# Patient Record
Sex: FEMALE | Race: BLACK OR AFRICAN AMERICAN | NOT HISPANIC OR LATINO | URBAN - METROPOLITAN AREA
[De-identification: names, ages, dates, MRNs, and addresses within clinical notes are randomized per-mention and may not be internally consistent; named-entity substitution may affect disease eponyms.]

---

## 2022-12-27 ENCOUNTER — EMERGENCY (EMERGENCY)
Facility: HOSPITAL | Age: 47
LOS: 1 days | Discharge: HOME | End: 2022-12-27
Attending: EMERGENCY MEDICINE | Admitting: EMERGENCY MEDICINE
Payer: COMMERCIAL

## 2022-12-27 VITALS
RESPIRATION RATE: 15 BRPM | OXYGEN SATURATION: 100 % | TEMPERATURE: 99 F | HEART RATE: 81 BPM | SYSTOLIC BLOOD PRESSURE: 144 MMHG | DIASTOLIC BLOOD PRESSURE: 59 MMHG

## 2022-12-27 LAB
ALBUMIN SERPL ELPH-MCNC: 4.3 G/DL — SIGNIFICANT CHANGE UP (ref 3.3–5)
ALP SERPL-CCNC: 64 U/L — SIGNIFICANT CHANGE UP (ref 40–120)
ALT FLD-CCNC: 11 U/L — SIGNIFICANT CHANGE UP (ref 4–33)
ANION GAP SERPL CALC-SCNC: 13 MMOL/L — SIGNIFICANT CHANGE UP (ref 7–14)
AST SERPL-CCNC: 14 U/L — SIGNIFICANT CHANGE UP (ref 4–32)
BASOPHILS # BLD AUTO: 0.02 K/UL — SIGNIFICANT CHANGE UP (ref 0–0.2)
BASOPHILS NFR BLD AUTO: 0.2 % — SIGNIFICANT CHANGE UP (ref 0–2)
BILIRUB SERPL-MCNC: 0.3 MG/DL — SIGNIFICANT CHANGE UP (ref 0.2–1.2)
BUN SERPL-MCNC: 10 MG/DL — SIGNIFICANT CHANGE UP (ref 7–23)
CALCIUM SERPL-MCNC: 10.3 MG/DL — SIGNIFICANT CHANGE UP (ref 8.4–10.5)
CHLORIDE SERPL-SCNC: 97 MMOL/L — LOW (ref 98–107)
CO2 SERPL-SCNC: 24 MMOL/L — SIGNIFICANT CHANGE UP (ref 22–31)
CREAT SERPL-MCNC: 0.82 MG/DL — SIGNIFICANT CHANGE UP (ref 0.5–1.3)
EGFR: 88 ML/MIN/1.73M2 — SIGNIFICANT CHANGE UP
EOSINOPHIL # BLD AUTO: 0.03 K/UL — SIGNIFICANT CHANGE UP (ref 0–0.5)
EOSINOPHIL NFR BLD AUTO: 0.3 % — SIGNIFICANT CHANGE UP (ref 0–6)
GLUCOSE SERPL-MCNC: 120 MG/DL — HIGH (ref 70–99)
HCT VFR BLD CALC: 39.9 % — SIGNIFICANT CHANGE UP (ref 34.5–45)
HGB BLD-MCNC: 13.4 G/DL — SIGNIFICANT CHANGE UP (ref 11.5–15.5)
IANC: 9.01 K/UL — HIGH (ref 1.8–7.4)
IMM GRANULOCYTES NFR BLD AUTO: 0.4 % — SIGNIFICANT CHANGE UP (ref 0–0.9)
LIDOCAIN IGE QN: 192 U/L — HIGH (ref 7–60)
LYMPHOCYTES # BLD AUTO: 1.22 K/UL — SIGNIFICANT CHANGE UP (ref 1–3.3)
LYMPHOCYTES # BLD AUTO: 11.3 % — LOW (ref 13–44)
MCHC RBC-ENTMCNC: 30 PG — SIGNIFICANT CHANGE UP (ref 27–34)
MCHC RBC-ENTMCNC: 33.6 GM/DL — SIGNIFICANT CHANGE UP (ref 32–36)
MCV RBC AUTO: 89.3 FL — SIGNIFICANT CHANGE UP (ref 80–100)
MONOCYTES # BLD AUTO: 0.47 K/UL — SIGNIFICANT CHANGE UP (ref 0–0.9)
MONOCYTES NFR BLD AUTO: 4.4 % — SIGNIFICANT CHANGE UP (ref 2–14)
NEUTROPHILS # BLD AUTO: 9.01 K/UL — HIGH (ref 1.8–7.4)
NEUTROPHILS NFR BLD AUTO: 83.4 % — HIGH (ref 43–77)
NRBC # BLD: 0 /100 WBCS — SIGNIFICANT CHANGE UP (ref 0–0)
NRBC # FLD: 0 K/UL — SIGNIFICANT CHANGE UP (ref 0–0)
PLATELET # BLD AUTO: 373 K/UL — SIGNIFICANT CHANGE UP (ref 150–400)
POTASSIUM SERPL-MCNC: 3.9 MMOL/L — SIGNIFICANT CHANGE UP (ref 3.5–5.3)
POTASSIUM SERPL-SCNC: 3.9 MMOL/L — SIGNIFICANT CHANGE UP (ref 3.5–5.3)
PROT SERPL-MCNC: 7.7 G/DL — SIGNIFICANT CHANGE UP (ref 6–8.3)
RBC # BLD: 4.47 M/UL — SIGNIFICANT CHANGE UP (ref 3.8–5.2)
RBC # FLD: 12.6 % — SIGNIFICANT CHANGE UP (ref 10.3–14.5)
SODIUM SERPL-SCNC: 134 MMOL/L — LOW (ref 135–145)
TROPONIN T, HIGH SENSITIVITY RESULT: 6 NG/L — SIGNIFICANT CHANGE UP
WBC # BLD: 10.79 K/UL — HIGH (ref 3.8–10.5)
WBC # FLD AUTO: 10.79 K/UL — HIGH (ref 3.8–10.5)

## 2022-12-27 PROCEDURE — 76705 ECHO EXAM OF ABDOMEN: CPT | Mod: 26

## 2022-12-27 PROCEDURE — 99285 EMERGENCY DEPT VISIT HI MDM: CPT

## 2022-12-27 RX ORDER — SODIUM CHLORIDE 9 MG/ML
1000 INJECTION INTRAMUSCULAR; INTRAVENOUS; SUBCUTANEOUS ONCE
Refills: 0 | Status: COMPLETED | OUTPATIENT
Start: 2022-12-27 | End: 2022-12-27

## 2022-12-27 RX ORDER — FAMOTIDINE 10 MG/ML
20 INJECTION INTRAVENOUS ONCE
Refills: 0 | Status: COMPLETED | OUTPATIENT
Start: 2022-12-27 | End: 2022-12-27

## 2022-12-27 RX ORDER — KETOROLAC TROMETHAMINE 30 MG/ML
15 SYRINGE (ML) INJECTION ONCE
Refills: 0 | Status: DISCONTINUED | OUTPATIENT
Start: 2022-12-27 | End: 2022-12-27

## 2022-12-27 RX ADMIN — FAMOTIDINE 20 MILLIGRAM(S): 10 INJECTION INTRAVENOUS at 23:59

## 2022-12-27 RX ADMIN — SODIUM CHLORIDE 1000 MILLILITER(S): 9 INJECTION INTRAMUSCULAR; INTRAVENOUS; SUBCUTANEOUS at 23:47

## 2022-12-27 RX ADMIN — Medication 15 MILLIGRAM(S): at 23:59

## 2022-12-27 NOTE — ED PROVIDER NOTE - NSFOLLOWUPINSTRUCTIONS_ED_ALL_ED_FT
Chronic Pancreatitis    Body outline showing digestive organs, with a close-up of a normal pancreas and an inflamed pancreas.   Chronic pancreatitis is permanent inflammation and scarring of the pancreas that leads to pancreatic dysfunction. The pancreas is a gland that is found behind the stomach. The pancreas makes proteins (enzymes) that help to digest food. It also releases hormones called glucagon and insulin. These help regulate blood sugar (glucose). Damage to the pancreas may affect digestion, may cause pain in the upper abdomen and back, and may cause diabetes. Inflammation can also irritate other organs in the abdomen near the pancreas.    At first, pancreatitis may be sudden (acute). When you have repeated or long-lasting episodes of acute pancreatitis, damage to the pancreas can be permanent and lead to chronic pancreatitis. Sometimes, though, there is no history of acute pancreatitis.      What are the causes?    The most common cause of this condition is heavy alcohol use. Other causes include:  •Hypertriglyceridemia. This is increased, or elevated, levels of triglycerides in the blood.      •Gallstones or other conditions that block the tube that drains the pancreas (pancreatic duct).      •Health conditions such as pancreatic cancer or a problem where the body's defense system (immune system) attacks the pancreas (autoimmune pancreatitis).      •Hypercalcemia. This is elevated calcium levels in the blood. This condition may be caused by the parathyroid gland being too active (hyperparathyroidism).      •Having an injured or infected pancreas.      •Being exposed to certain medicines or certain chemicals.      In children, chronic pancreatitis is most often caused by inherited conditions. These come from genes that are passed from parent to child. The most common of these conditions is cystic fibrosis.    In some cases, the cause of chronic pancreatitis may not be known.      What increases the risk?    This condition is more likely to develop in people who:  •Are male.      •Are 35–55 years old.      •Have a family history of pancreatitis.      •Smoke tobacco.      •Drink a lot of alcohol over a long period of time.        What are the signs or symptoms?    Symptoms of this condition may include:  •Pain in the abdomen or upper back. Pain may be severe and often gets worse after you eat.      •Nausea and vomiting.      •Fever.      •Weight loss.      •A change in the color and firmness (consistency) of stool (feces), such as stools that are oily, fatty, or severiano-colored.        How is this diagnosed?    This condition is diagnosed based on your symptoms, your medical history, and a physical exam. You may have tests, such as:  •Blood tests.      •Stool samples.      •Biopsy of the pancreas. This is the removal of a sample of pancreas tissue to be tested in a lab.      •Imaging tests, such as CT scans, MRIs, or an ultrasound of the abdomen.        How is this treated?    Chronic pancreatitis results in permanent damage that leads to pancreatic dysfunction and long-term (chronic) pain. To manage chronic pancreatitis, you will need to:  •Stop using alcohol or tobacco.    •Manage pain. Methods of pain control may include:  •Medicines such as those used for pain or depression (antidepressants).      •Surgery to remove a blockage or buildup of fluid causing pain.      •A procedure to block the nerves that sense pain in the pancreas (celiac plexus nerve block).      •Improve digestion. You may be given:  •Medicines to replace your pancreatic enzymes.      •Vitamin supplements.      •A specific diet to follow. You may work with a dietitian to make an eating plan.      •Monitor for the development of diabetes. You may need to:  •Get screened for diabetes regularly.      •Check your blood glucose levels at home at regular times.        Sometimes, acute flares of pain may require hospital treatment.      Follow these instructions at home:      Eating and drinking       A sign showing that a person should not drink alcohol.       Three cups showing dark yellow, yellow, and pale yellow urine.     • Do not drink alcohol. If you need help quitting, ask your health care provider.    •Follow a diet as told by your health care provider or dietitian, if this applies. This may include:  •Limiting how much fat you eat.      •Eating smaller meals more often.      •Avoiding caffeine.        •Drink enough fluid to keep your urine pale yellow.      General instructions     •Take over-the-counter and prescription medicines only as told by your health care provider. These include vitamin supplements.    •Ask your health care provider if the medicine prescribed to you:   •Requires you to avoid driving or using machinery.     •Can cause constipation. You may need to take these actions to prevent or treat constipation:   •Take over-the-counter or prescription medicines.       •Eat foods that are high in fiber, such as beans, whole grains, and fresh fruits and vegetables.       •Limit foods that are high in fat and processed sugars, such as fried or sweet foods.          • Do not use any products that contain nicotine or tobacco. These products include cigarettes, chewing tobacco, and vaping devices, such as e-cigarettes. If you need help quitting, ask your health care provider.      •If directed, check your blood sugar at home as told.      •Keep all follow-up visits. This is important.        Contact a health care provider if:    •You have pain that does not get better with medicine.      •You have a fever.      •You have sudden weight loss.        Get help right away if:    •Your pain suddenly gets worse.      •You have sudden swelling in your abdomen.      •You start to vomit often.      •You have diarrhea that does not go away.      •You vomit blood or have blood in your stool.      •You become confused or you have trouble thinking clearly.      These symptoms may be an emergency. Get help right away. Call 911.    • Do not wait to see if the symptoms will go away.        • Do not drive yourself to the hospital.         Summary    •Chronic pancreatitis is permanent inflammation and scarring of the pancreas that leads to pancreatic dysfunction. Damage to the pancreas may affect digestion, may cause pain in the upper abdomen and back, and may cause diabetes. Inflammation can also irritate other organs in the abdomen near the pancreas.      •Common causes of this condition are heavy alcohol use, gallstones, increased (elevated) levels of triglycerides, and certain medicines.      •To manage this condition: control pain, replace enzymes, and do not drink alcohol.      This information is not intended to replace advice given to you by your health care provider. Make sure you discuss any questions you have with your health care provider.

## 2022-12-27 NOTE — ED ADULT TRIAGE NOTE - CHIEF COMPLAINT QUOTE
Pt arrives ambulatory to triage c/o midline abdominal pain, N/V since Friday. Pt states "it feels like when I had pancreatitis." Pt appears comfortable. PMH: HTN, pancreatitis.

## 2022-12-27 NOTE — ED PROVIDER NOTE - PHYSICAL EXAMINATION
Vital signs reviewed  GENERAL: Patient nontoxic appearing, NAD  HEAD: NCAT  EYES: Anicteric  ENT: MMM  NECK: Supple, non tender  RESPIRATORY: Normal respiratory effort. CTA B/L. No wheezing, rales, rhonchi  CARDIOVASCULAR: Regular rate and rhythm  ABDOMEN: Soft. Nondistended. +epigastric ttp no guarding or rebound   MUSCULOSKELETAL/EXTREMITIES: Brisk cap refill. 2+ radial pulses. No leg edema.  SKIN:  Warm and dry  NEURO: AAOx3. No gross FND.  PSYCHIATRIC: Cooperative. Affect appropriate.

## 2022-12-27 NOTE — ED PROVIDER NOTE - NS ED ROS FT
Constitutional: No fever, chills.  Eyes:  No visual changes  ENMT:  No neck pain  Cardiac:  No chest pain  Respiratory:  No cough, SOB  GI:  +abdominal pain.  :  No dysuria, hematuria  MS:  No back pain.  Neuro:  No headache or lightheadedness  Skin:  No skin rash  Except as documented in the HPI,  all other systems are negative.

## 2022-12-27 NOTE — ED PROVIDER NOTE - OBJECTIVE STATEMENT
47 h/o pancreatitis 4-5 years prior (unknown trigger, thought possibly medication induced at that time), HTN, hysterectomy, presents with 4 days of epigastric pain radiating to back and N/V since yesterday.  Feels similar to previous pancreatitis episode. abd pain is currently 6/10, mostly upper abd, non radiating  pt denies any fever, chills, cp, palpitations, sob, v/d, dysuria, numbness, vaginal bleeding/discharge

## 2022-12-27 NOTE — ED PROVIDER NOTE - CLINICAL SUMMARY MEDICAL DECISION MAKING FREE TEXT BOX
Differential diagnosis includes: biliary, pancreatitis, pud, gastritis, likely not cardiac in nature. Abdominal exam without peritoneal signs. No evidence of acute abdomen at this time. Well appearing.  Plan: labs, RUQUS. pain control, serial reassessment

## 2022-12-27 NOTE — ED ADULT NURSE NOTE - OBJECTIVE STATEMENT
Received the patient in spot 25 A with c/o upper epigastric pain with nausea and vomiting since Friday. Not in acute distress. Respirations are even and nonlabored. Alert and oriented x 4, safety maintained. Ambulates independently in steady gait. Attempted IV line insertion with no success. MD made aware about ? USIV.

## 2022-12-27 NOTE — ED PROVIDER NOTE - DISPOSITION TYPE
Avoid strenuous activities.  No lifting greater than 10 pounds.  Take the prescriptions as prescribed.  Closely monitor your blood sugars and treat accordingly.  Follow your primary care provider in 1 week.  If your symptoms are persistent he may benefit from either physical therapy and/or an MRI.  Return to the ER for any change or worsening symptoms.  
DISCHARGE

## 2022-12-27 NOTE — ED PROVIDER NOTE - ATTENDING CONTRIBUTION TO CARE
Dr. Jackson:  I have personally performed a face to face bedside history and physical examination of this patient. I have discussed the history, examination, review of systems, assessment and plan of management with the resident. I have reviewed the electronic medical record and amended it to reflect my history, review of systems, physical exam, assessment and plan.    47F h/o pancreatitis 4-5 years prior (unknown trigger, thought possibly medication induced at that time), HTN, hysterectomy, presents with 4 days of epigastric pain radiating to back and N/V since yesterday.  Feels similar to previous pancreatitis episode.  ROS otherwise negative.  Last BM today.    Exam:  - nad  - rrr  - ctab  - abd soft, TTP epigastric area    A/P  - epigastric pain ,eval pancreatitis/gallbladder etiology, r/o ACS  - cbc, cmp, trop, lipase, ekg, cxr, triglycerides, RUQ US

## 2022-12-27 NOTE — ED PROVIDER NOTE - PATIENT PORTAL LINK FT
You can access the FollowMyHealth Patient Portal offered by [facility] by registering at the following website: [email or web address]. By joining [employer]’s FollowMyHealth portal, you will also be able to view your health information using other applications (apps) compatible with our system.

## 2022-12-27 NOTE — ED PROVIDER NOTE - NSFOLLOWUPCLINICS_GEN_ALL_ED_FT
Gastroenterology at [facility]  Gastroenterology  [address] [place] [id number]  Phone: [phone]  Fax:     [facility]  Gastroenterology  [address] [place] [address]  Phone: [phone]  Fax: [phone]

## 2022-12-27 NOTE — ED PROVIDER NOTE - PROGRESS NOTE DETAILS
Law Monzon PGY-3 patient tolerating fluids PO. has mild discomfort with eating solids. would like to trial home and gi rest. will dc with strict return precautions for worsening pain, vomiting, unable to tolerate PO or any new concerning sxs to come back to ER.  pt follow up with GI

## 2022-12-28 VITALS
OXYGEN SATURATION: 100 % | HEART RATE: 60 BPM | SYSTOLIC BLOOD PRESSURE: 111 MMHG | DIASTOLIC BLOOD PRESSURE: 72 MMHG | TEMPERATURE: 99 F | RESPIRATION RATE: 16 BRPM

## 2022-12-28 RX ORDER — FAMOTIDINE 10 MG/ML
20 INJECTION INTRAVENOUS ONCE
Refills: 0 | Status: COMPLETED | OUTPATIENT
Start: 2022-12-28 | End: 2022-12-28

## 2022-12-28 RX ORDER — ONDANSETRON 8 MG/1
4 TABLET, FILM COATED ORAL ONCE
Refills: 0 | Status: COMPLETED | OUTPATIENT
Start: 2022-12-28 | End: 2022-12-28

## 2022-12-28 RX ORDER — OXYCODONE HYDROCHLORIDE 5 MG/1
1 TABLET ORAL
Qty: 9 | Refills: 0
Start: 2022-12-28 | End: 2022-12-30

## 2022-12-28 RX ADMIN — ONDANSETRON 4 MILLIGRAM(S): 8 TABLET, FILM COATED ORAL at 01:25

## 2022-12-28 RX ADMIN — FAMOTIDINE 20 MILLIGRAM(S): 10 INJECTION INTRAVENOUS at 03:32

## 2022-12-28 RX ADMIN — Medication 30 MILLILITER(S): at 03:32

## 2022-12-28 NOTE — ED ADULT NURSE REASSESSMENT NOTE - NS ED NURSE REASSESS COMMENT FT1
Break RN note- Patient resting quietly in bed, breathing even and nonlabored. No acute distress. Patient to be PO challenged. Patient appears comfortable. Safety maintained. Patient stable upon exiting the room.
pt in bed awake and alert, breathing well on RA and in NAD, with c/o abdo pain, unable to obtain IV access, USIV placed by MD, but then pt moved to US. meds will be given upon return. pt able to ambulate to and from the rest room with steady gait, pain is a 7/10. family member at bedside.
pt asleep, verbalized pain is relieved to 3/10. able to tolerate pain as just slight "discomfort" at this time.
